# Patient Record
Sex: FEMALE | Race: WHITE | NOT HISPANIC OR LATINO | URBAN - METROPOLITAN AREA
[De-identification: names, ages, dates, MRNs, and addresses within clinical notes are randomized per-mention and may not be internally consistent; named-entity substitution may affect disease eponyms.]

---

## 2024-04-30 ENCOUNTER — LAB (OUTPATIENT)
Dept: LAB | Facility: LAB | Age: 34
End: 2024-04-30
Payer: COMMERCIAL

## 2024-04-30 ENCOUNTER — OFFICE VISIT (OUTPATIENT)
Dept: PRIMARY CARE | Facility: CLINIC | Age: 34
End: 2024-04-30
Payer: COMMERCIAL

## 2024-04-30 VITALS
HEART RATE: 74 BPM | TEMPERATURE: 98.4 F | WEIGHT: 199 LBS | OXYGEN SATURATION: 98 % | SYSTOLIC BLOOD PRESSURE: 124 MMHG | DIASTOLIC BLOOD PRESSURE: 82 MMHG | RESPIRATION RATE: 16 BRPM | HEIGHT: 65 IN | BODY MASS INDEX: 33.15 KG/M2

## 2024-04-30 DIAGNOSIS — R19.7 DIARRHEA OF PRESUMED INFECTIOUS ORIGIN: ICD-10-CM

## 2024-04-30 DIAGNOSIS — R19.7 DIARRHEA OF PRESUMED INFECTIOUS ORIGIN: Primary | ICD-10-CM

## 2024-04-30 LAB
ALBUMIN SERPL BCP-MCNC: 4 G/DL (ref 3.4–5)
ALP SERPL-CCNC: 70 U/L (ref 33–110)
ALT SERPL W P-5'-P-CCNC: 14 U/L (ref 7–45)
ANION GAP SERPL CALC-SCNC: 10 MMOL/L (ref 10–20)
AST SERPL W P-5'-P-CCNC: 15 U/L (ref 9–39)
BILIRUB SERPL-MCNC: 0.3 MG/DL (ref 0–1.2)
BUN SERPL-MCNC: 11 MG/DL (ref 6–23)
CALCIUM SERPL-MCNC: 8.9 MG/DL (ref 8.6–10.3)
CHLORIDE SERPL-SCNC: 104 MMOL/L (ref 98–107)
CO2 SERPL-SCNC: 26 MMOL/L (ref 21–32)
CREAT SERPL-MCNC: 0.78 MG/DL (ref 0.5–1.05)
EGFRCR SERPLBLD CKD-EPI 2021: >90 ML/MIN/1.73M*2
ERYTHROCYTE [DISTWIDTH] IN BLOOD BY AUTOMATED COUNT: 12.6 % (ref 11.5–14.5)
GLUCOSE SERPL-MCNC: 89 MG/DL (ref 74–99)
HCT VFR BLD AUTO: 39.9 % (ref 36–46)
HGB BLD-MCNC: 12.8 G/DL (ref 12–16)
LIPASE SERPL-CCNC: 12 U/L (ref 9–82)
MCH RBC QN AUTO: 27.6 PG (ref 26–34)
MCHC RBC AUTO-ENTMCNC: 32.1 G/DL (ref 32–36)
MCV RBC AUTO: 86 FL (ref 80–100)
NRBC BLD-RTO: 0 /100 WBCS (ref 0–0)
PLATELET # BLD AUTO: 332 X10*3/UL (ref 150–450)
POTASSIUM SERPL-SCNC: 4.1 MMOL/L (ref 3.5–5.3)
PROT SERPL-MCNC: 6.9 G/DL (ref 6.4–8.2)
RBC # BLD AUTO: 4.64 X10*6/UL (ref 4–5.2)
SODIUM SERPL-SCNC: 136 MMOL/L (ref 136–145)
WBC # BLD AUTO: 10.6 X10*3/UL (ref 4.4–11.3)

## 2024-04-30 PROCEDURE — 85027 COMPLETE CBC AUTOMATED: CPT

## 2024-04-30 PROCEDURE — 80053 COMPREHEN METABOLIC PANEL: CPT

## 2024-04-30 PROCEDURE — 99214 OFFICE O/P EST MOD 30 MIN: CPT | Performed by: FAMILY MEDICINE

## 2024-04-30 PROCEDURE — 1036F TOBACCO NON-USER: CPT | Performed by: FAMILY MEDICINE

## 2024-04-30 PROCEDURE — 83690 ASSAY OF LIPASE: CPT

## 2024-04-30 PROCEDURE — 36415 COLL VENOUS BLD VENIPUNCTURE: CPT

## 2024-04-30 ASSESSMENT — ENCOUNTER SYMPTOMS
CARDIOVASCULAR NEGATIVE: 1
NAUSEA: 1
RESPIRATORY NEGATIVE: 1
ABDOMINAL PAIN: 1
CONSTITUTIONAL NEGATIVE: 1
DIARRHEA: 1
VOMITING: 1
MUSCULOSKELETAL NEGATIVE: 1

## 2024-04-30 NOTE — PROGRESS NOTES
"Subjective   Patient ID: Stefani Trivedi is a 33 y.o. female who presents for GI Problem.    Patient went to [place] for a trip.  Last week on Tuesday she did have muscles.  The next day she felt queasy.  However she did go on a boat.  She also got very bad sunburn and later that night had a lot of chills, feeling really hot from the sunburn, could not sit outside had to go to the room.  Next day woke up and had a lot of vomiting and diarrhea.  Since then she was having diarrhea numerous times a day.  She did try over-the-counter meds.  That did not help.  Had a very strong odor, it was dark.  However it has been lightening up.  And it is becoming more tolerable.  Less often color is returning more than normal.  Patient states everyone in her group did have this to some degree.  Her  had but not nearly as bad and it resolved that day.  Her is going on.  Patient thinks he may have gotten heatstroke, possibly ate bad food, possible ice was contaminated.  They did drink bottled water everything was filtered at the resort.  Patient did get an IV at the wellness center in [place].  This did help somewhat.  Patient did not have any recent antibiotic use.  Patient states she did have some epigastric discomfort.  That improved on the left, then below her bellybutton and above and notes on the left.  She did not have any right lower quadrant discomfort    GI Problem  The primary symptoms include abdominal pain, nausea, vomiting and diarrhea.        Review of Systems   Constitutional: Negative.    HENT: Negative.     Respiratory: Negative.     Cardiovascular: Negative.    Gastrointestinal:  Positive for abdominal pain, diarrhea, nausea and vomiting.   Genitourinary: Negative.    Musculoskeletal: Negative.        Objective   /82 (BP Location: Right arm, Patient Position: Sitting, BP Cuff Size: Adult)   Pulse 74   Temp 36.9 °C (98.4 °F)   Resp 16   Ht 1.651 m (5' 5\")   Wt 90.3 kg (199 lb)   SpO2 98%   BMI 33.12 " kg/m²     Physical Exam  Constitutional:       Appearance: Normal appearance.   Cardiovascular:      Rate and Rhythm: Normal rate and regular rhythm.   Pulmonary:      Effort: Pulmonary effort is normal.      Breath sounds: Normal breath sounds.   Abdominal:      General: Abdomen is flat. Bowel sounds are normal. There is no distension.      Palpations: Abdomen is soft.      Tenderness: There is abdominal tenderness (Slight discomfort in the epigastric and left lower quadrant.). There is no guarding or rebound.   Neurological:      Mental Status: She is alert.         Assessment/Plan   Problem List Items Addressed This Visit    None    Patient did try Pepto-Bismol that may have been the cause of the darker stool.  It is returned almost to normal color however she still having diarrhea.  Will ensure there is no bacterial causes, parasites.  Patient to continue fluids, eat a brat diet.  If any fever chills any blood in stool or any worsening symptoms any fever chills abdominal pain any worsening symptoms go to the ER  Follow-up in 1 week.  Very likely will resolve on its own but since she is already had a week of this we will try medication if for E. coli and C. difficile is negative.  While we await if anything worsens go to the ER.

## 2024-05-01 ENCOUNTER — LAB (OUTPATIENT)
Dept: LAB | Facility: LAB | Age: 34
End: 2024-05-01
Payer: COMMERCIAL

## 2024-05-01 DIAGNOSIS — R19.7 DIARRHEA OF PRESUMED INFECTIOUS ORIGIN: Primary | ICD-10-CM

## 2024-05-01 DIAGNOSIS — R19.7 DIARRHEA OF PRESUMED INFECTIOUS ORIGIN: ICD-10-CM

## 2024-05-01 LAB
C COLI+JEJ+UPSA DNA STL QL NAA+PROBE: NOT DETECTED
C DIF TOX TCDA+TCDB STL QL NAA+PROBE: NOT DETECTED
EC STX1 GENE STL QL NAA+PROBE: NOT DETECTED
EC STX2 GENE STL QL NAA+PROBE: NOT DETECTED
NOROVIRUS GI + GII RNA STL NAA+PROBE: NOT DETECTED
RV RNA STL NAA+PROBE: NOT DETECTED
SALMONELLA DNA STL QL NAA+PROBE: NOT DETECTED
SHIGELLA DNA SPEC QL NAA+PROBE: NOT DETECTED
V CHOLERAE DNA STL QL NAA+PROBE: NOT DETECTED
Y ENTEROCOL DNA STL QL NAA+PROBE: NOT DETECTED

## 2024-05-01 PROCEDURE — 87328 CRYPTOSPORIDIUM AG IA: CPT

## 2024-05-01 PROCEDURE — 87506 IADNA-DNA/RNA PROBE TQ 6-11: CPT

## 2024-05-01 PROCEDURE — 87493 C DIFF AMPLIFIED PROBE: CPT

## 2024-05-01 PROCEDURE — 87329 GIARDIA AG IA: CPT

## 2024-05-01 NOTE — RESULT ENCOUNTER NOTE
Stefani your blood work appears normal.  Your lipase was normal which checks your pancreas  Your electrolytes were fine  Your liver and kidney function were normal  Your white blood cell count was normal.  Will await the results of the stool test    I hope you are feeling better

## 2024-05-02 NOTE — RESULT ENCOUNTER NOTE
Stefani your stool studies appeared negative for the common bacterial and viral causes.  Your C. difficile was negative.  We are still awaiting your ova and parasite results  If your symptoms are resolving we can monitor if you are still having we can try an antibiotic as we await the final results  Please let me know

## 2024-05-04 LAB
CRYPTOSP AG STL QL IA: NEGATIVE
G LAMBLIA AG STL QL IA: NEGATIVE

## 2024-05-05 LAB — O+P STL MICRO: NEGATIVE

## 2024-05-06 NOTE — RESULT ENCOUNTER NOTE
Stefani your ova and parasite test were negative Giardia test was also negative.  I am hoping the symptoms have resolved.  Please let me know how you are doing

## 2024-05-09 DIAGNOSIS — R19.7 DIARRHEA OF PRESUMED INFECTIOUS ORIGIN: Primary | ICD-10-CM

## 2024-07-31 ENCOUNTER — APPOINTMENT (OUTPATIENT)
Dept: GASTROENTEROLOGY | Facility: CLINIC | Age: 34
End: 2024-07-31
Payer: COMMERCIAL